# Patient Record
Sex: MALE | Race: WHITE | Employment: FULL TIME | URBAN - METROPOLITAN AREA
[De-identification: names, ages, dates, MRNs, and addresses within clinical notes are randomized per-mention and may not be internally consistent; named-entity substitution may affect disease eponyms.]

---
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
## 2022-08-13 ENCOUNTER — HOSPITAL ENCOUNTER (EMERGENCY)
Age: 22
Discharge: LEFT BEFORE DISCHARGE | End: 2022-08-13

## 2022-08-13 VITALS
HEART RATE: 98 BPM | DIASTOLIC BLOOD PRESSURE: 81 MMHG | RESPIRATION RATE: 16 BRPM | BODY MASS INDEX: 25.76 KG/M2 | OXYGEN SATURATION: 98 % | WEIGHT: 170 LBS | HEIGHT: 68 IN | TEMPERATURE: 98 F | SYSTOLIC BLOOD PRESSURE: 146 MMHG

## 2022-08-13 DIAGNOSIS — Z53.21 ELOPED FROM EMERGENCY DEPARTMENT: Primary | ICD-10-CM

## 2022-08-13 ASSESSMENT — PAIN - FUNCTIONAL ASSESSMENT: PAIN_FUNCTIONAL_ASSESSMENT: 0-10

## 2022-08-13 ASSESSMENT — PAIN DESCRIPTION - DESCRIPTORS: DESCRIPTORS: THROBBING

## 2022-08-13 ASSESSMENT — PAIN DESCRIPTION - LOCATION: LOCATION: NOSE

## 2022-08-13 ASSESSMENT — PAIN SCALES - GENERAL: PAINLEVEL_OUTOF10: 3

## 2022-08-13 ASSESSMENT — PAIN DESCRIPTION - PAIN TYPE: TYPE: ACUTE PAIN

## 2022-08-14 ENCOUNTER — APPOINTMENT (OUTPATIENT)
Dept: GENERAL RADIOLOGY | Age: 22
End: 2022-08-14
Payer: COMMERCIAL

## 2022-08-14 ENCOUNTER — HOSPITAL ENCOUNTER (EMERGENCY)
Age: 22
Discharge: HOME | End: 2022-08-14
Payer: COMMERCIAL

## 2022-08-14 VITALS
RESPIRATION RATE: 14 BRPM | SYSTOLIC BLOOD PRESSURE: 137 MMHG | HEART RATE: 71 BPM | OXYGEN SATURATION: 97 % | DIASTOLIC BLOOD PRESSURE: 76 MMHG | TEMPERATURE: 97.2 F

## 2022-08-14 DIAGNOSIS — S02.2XXA CLOSED FRACTURE OF NASAL BONE, INITIAL ENCOUNTER: Primary | ICD-10-CM

## 2022-08-14 PROCEDURE — 99283 EMERGENCY DEPT VISIT LOW MDM: CPT

## 2022-08-14 PROCEDURE — 6370000000 HC RX 637 (ALT 250 FOR IP): Performed by: PHYSICIAN ASSISTANT

## 2022-08-14 PROCEDURE — 70160 X-RAY EXAM OF NASAL BONES: CPT

## 2022-08-14 RX ORDER — KETOROLAC TROMETHAMINE 10 MG/1
10 TABLET, FILM COATED ORAL ONCE
Status: COMPLETED | OUTPATIENT
Start: 2022-08-14 | End: 2022-08-14

## 2022-08-14 RX ADMIN — KETOROLAC TROMETHAMINE 10 MG: 10 TABLET, FILM COATED ORAL at 18:14

## 2022-08-14 NOTE — ED PROVIDER NOTES
[facility] Emergency Department    CHIEF COMPLAINT  Nose Problem (Someone ran into his nose while playing basketball)      SHARED SERVICE VISIT  Evaluated by LIZ. My supervising physician was available for consultation. HISTORY OF PRESENT ILLNESS  Emre Warner is a 23 y.o. male who presents to the ED complaining of 1 day history of nasal pain and swelling. Patient accompanied by girlfriend today for evaluation. Patient states that he was playing basketball when he was accidentally head butted in the nose. Reports nosebleed. Unaware of which side. Reports pain 5 out of 10. He has attempted no interventions. Does not appear to radiate. No headache, lightheadedness, dizziness or confusion. Denies loss of consciousness. No neck or back pain. Denies eye pain. No visual changes disturbances. No dental pain. Order    No other complaints, modifying factors or associated symptoms. Nursing notes reviewed. History reviewed. No pertinent past medical history. History reviewed. No pertinent surgical history. History reviewed. No pertinent family history.   Social History     Socioeconomic History    Marital status: Single     Spouse name: Not on file    Number of children: Not on file    Years of education: Not on file    Highest education level: Not on file   Occupational History    Not on file   Tobacco Use    Smoking status: Never    Smokeless tobacco: Never   Vaping Use    Vaping Use: Every day    Substances: Nicotine   Substance and Sexual Activity    Alcohol use: No    Drug use: No    Sexual activity: Never   Other Topics Concern    Not on file   Social History Narrative    Not on file     Social Determinants of Health     Financial Resource Strain: Not on file   Food Insecurity: Not on file   Transportation Needs: Not on file   Physical Activity: Not on file   Stress: Not on file   Social Connections: Not on file   Intimate Partner Violence: Not on file   Housing Stability: Not on file     Current Facility-Administered Medications   Medication Dose Route Frequency Provider Last Rate Last Admin    ketorolac (TORADOL) tablet 10 mg  10 mg Oral Once Cara Sena, [address]         No current outpatient medications on file. No Known Allergies    REVIEW OF SYSTEMS  10 systems reviewed, pertinent positives per HPI otherwise noted to be negative    PHYSICAL EXAM  /76   Pulse 71   Temp 97.2 °F (36.2 °C) (Axillary)   Resp 14   SpO2 97%   GENERAL APPEARANCE: Awake and alert. Cooperative. No acute distress. HEAD: Normocephalic. Atraumatic. EYES: PERRL. EOM's grossly intact. Mild infraorbital ecchymoses bilaterally. No globe tenderness. No blood noted to anterior chambers. ENT: Mucous membranes are moist.  No oral lesions lacerations. No TMJ pain or malocclusion. Nasal soft tissue swelling with tenderness over bridge. No epistaxis. No septal hematomas. NECK: Supple. HEART: RRR. No murmurs. LUNGS: Respirations unlabored. CTAB. Good air exchange. Speaking comfortably in full sentences. ABDOMEN: Soft. Non-distended. Non-tender. No guarding or rebound. No masses. No organomegaly. EXTREMITIES: No peripheral edema. Moves all extremities equally. All extremities neurovascularly intact. SKIN: Warm and dry. No acute rashes. NEUROLOGICAL: Alert and oriented. CN's 2-12 intact. No gross facial drooping. Strength 5/5, sensation intact. PSYCHIATRIC: Normal mood and affect. RADIOLOGY  XR NASAL BONE (MIN 3 VIEWS )    Result Date: 8/14/2022  EXAMINATION: THREE XRAY VIEWS OF THE NASAL BONES 8/14/2022 5:11 pm COMPARISON: None. HISTORY: ORDERING SYSTEM PROVIDED HISTORY: poss broken nose TECHNOLOGIST PROVIDED HISTORY: Reason for exam:->poss broken nose Reason for Exam: possible broken nose FINDINGS: Paranasal sinuses appear clear. Minimally displaced fractures superior nasal bone. No other fractures identified.      Minimally displaced fractures superior nasal bones ED COURSE  Patient received Toradol for pain, with good relief. Triage vitals stable. Nasal bone x-rays with minimally displaced superior nasal bone fractures. At this time patient will be discharged home with nosebleed precautions as well as ENT referral for further outpatient follow-up. Have discussed return precautions and recommendations for follow-up otherwise and patient in agreement and comfortable with discharge. A discussion was had with Mr. Dennise Tirado regarding facial injury, ED findings and recommendations for follow-up. Risk management discussed and shared decision making had with patient and/or surrogate. All questions were answered. Patient will follow up with ENT within 1 week for further evaluation/treatment. All questions answered. Patient will return to ED for new/worsening symptoms. Patient will continue over-the-counter Tylenol and or Motrin as needed for pain. MDM  I estimate there is LOW risk for a ANAPHYLAXIS, DEEP SPACE INFECTION (e.g., GEREMIASS ANGINA OR RETROPHARYNGEAL ABSCESS), EPIGLOTTITIS, MENINGITIS, or AIRWAY COMPROMISE, thus I consider the discharge disposition reasonable. Also, there is no evidence or peritonitis, sepsis, or toxicity. Rubi Espinoza and I have discussed the diagnosis and risks, and we agree with discharging home to follow-up with their primary doctor. We also discussed returning to the Emergency Department immediately if new or worsening symptoms occur. We have discussed the symptoms which are most concerning (e.g., changing or worsening pain, trouble swallowing or breathing, neck stiffness or fever) that necessitate immediate return. Final Impression  1. Closed fracture of nasal bone, initial encounter      Discharge Vital Signs:  Blood pressure 137/76, pulse 71, temperature 97.2 °F (36.2 °C), temperature source Axillary, resp. rate 14, SpO2 97 %. DISPOSITION  Patient was discharged to home in good condition.           Shirley Hansen [place]  08/14/22 [address] [facility],[address]

## 2022-08-14 NOTE — ED PROVIDER NOTES
[address] ED  EMERGENCY DEPARTMENT ENCOUNTER        Pt Name: Lupe Lisa  MRN: [id number]  [place] 2000  Date of evaluation: 8/13/2022  Provider: MARYLOU Brand CNP  PCP: No primary care provider on file. I was notified by nursing staff the patient eloped from the emergency department prior to my evaluation.             MARYLOU Brand CNP  08/13/22 7787

## 2025-04-19 ENCOUNTER — APPOINTMENT (OUTPATIENT)
Dept: GENERAL RADIOLOGY | Age: 25
End: 2025-04-19

## 2025-04-19 ENCOUNTER — HOSPITAL ENCOUNTER (EMERGENCY)
Age: 25
Discharge: HOME | End: 2025-04-19
Attending: EMERGENCY MEDICINE

## 2025-04-19 VITALS
TEMPERATURE: 98 F | RESPIRATION RATE: 16 BRPM | DIASTOLIC BLOOD PRESSURE: 54 MMHG | BODY MASS INDEX: 25.18 KG/M2 | SYSTOLIC BLOOD PRESSURE: 118 MMHG | HEIGHT: 69 IN | HEART RATE: 75 BPM | OXYGEN SATURATION: 96 % | WEIGHT: 170 LBS

## 2025-04-19 DIAGNOSIS — S61.412A LACERATION OF LEFT HAND WITHOUT FOREIGN BODY, INITIAL ENCOUNTER: Primary | ICD-10-CM

## 2025-04-19 PROCEDURE — 12002 RPR S/N/AX/GEN/TRNK2.6-7.5CM: CPT

## 2025-04-19 PROCEDURE — 90471 IMMUNIZATION ADMIN: CPT | Performed by: EMERGENCY MEDICINE

## 2025-04-19 PROCEDURE — 6360000002 HC RX W HCPCS: Performed by: EMERGENCY MEDICINE

## 2025-04-19 PROCEDURE — 73130 X-RAY EXAM OF HAND: CPT

## 2025-04-19 PROCEDURE — 90715 TDAP VACCINE 7 YRS/> IM: CPT | Performed by: EMERGENCY MEDICINE

## 2025-04-19 PROCEDURE — 99284 EMERGENCY DEPT VISIT MOD MDM: CPT

## 2025-04-19 RX ORDER — ACETAMINOPHEN 500 MG
500 TABLET ORAL EVERY 6 HOURS PRN
Qty: 30 TABLET | Refills: 0 | Status: SHIPPED | OUTPATIENT
Start: 2025-04-19

## 2025-04-19 RX ORDER — BACITRACIN ZINC AND POLYMYXIN B SULFATE 500; 1000 [USP'U]/G; [USP'U]/G
OINTMENT TOPICAL
Qty: 15 G | Refills: 1 | Status: SHIPPED | OUTPATIENT
Start: 2025-04-19 | End: 2025-04-26

## 2025-04-19 RX ORDER — CEPHALEXIN 500 MG/1
500 CAPSULE ORAL 3 TIMES DAILY
Qty: 21 CAPSULE | Refills: 0 | Status: SHIPPED | OUTPATIENT
Start: 2025-04-19 | End: 2025-04-26

## 2025-04-19 RX ADMIN — TETANUS TOXOID, REDUCED DIPHTHERIA TOXOID AND ACELLULAR PERTUSSIS VACCINE, ADSORBED 0.5 ML: 5; 2.5; 8; 8; 2.5 SUSPENSION INTRAMUSCULAR at 06:39

## 2025-04-19 ASSESSMENT — LIFESTYLE VARIABLES
HOW MANY STANDARD DRINKS CONTAINING ALCOHOL DO YOU HAVE ON A TYPICAL DAY: 1 OR 2
HOW OFTEN DO YOU HAVE A DRINK CONTAINING ALCOHOL: MONTHLY OR LESS

## 2025-04-19 ASSESSMENT — PAIN SCALES - GENERAL: PAINLEVEL_OUTOF10: 2

## 2025-04-19 NOTE — ED PROVIDER NOTES
EMERGENCY DEPARTMENT ENCOUNTER     [facility] EMERGENCY DEPARTMENT     Pt Name: Segundo Barrientos   MRN: [id number]   Birthdate 2000   Date of evaluation: 4/19/2025   Provider: Fortino Neff MD   PCP: No primary care provider on file.   Note Started: 9:45 AM EDT 4/19/25     CHIEF COMPLAINT     Chief Complaint   Patient presents with    Hand Injury     L hand laceration in two places. Pt states he fell down the steps with a glass in his hand. He does not think he has any glass in the wound. He does not know when his last TDAP was.         HISTORY OF PRESENT ILLNESS:  History from : Patient   Limitations to history : None     Segundo Barrientos is a 25 y.o. male who presents after a fall with hand laceration.  Patient states he fell down some steps with a glass in his hand causing him to lacerate his left hand and just proximal to the left hand.  This occurred around 1:30 AM.  He denies any other injury.  Denies any head trauma, loss conscious, chest pain, upper or lower back pain, neck pain.  He only complains of pain to his hand where his lacerations are.  No weakness or numbness.  No sensation of foreign body.    Nursing Notes were all reviewed and agreed with or any disagreements were addressed in the HPI.     ROS: Positives and Pertinent negatives as per HPI.    PAST MEDICAL HISTORY     PHYSICAL EXAM:  ED Triage Vitals   BP Systolic BP Percentile Diastolic BP Percentile Temp Temp Source Pulse Respirations SpO2   04/19/25 0524 -- -- 04/19/25 0523 04/19/25 0630 04/19/25 0523 04/19/25 0523 04/19/25 0523   126/73   97.8 °F (36.6 °C) Oral 71 18 98 %      Height Weight - Scale         04/19/25 0523 04/19/25 0523         1.753 m (5' 9\") 77.1 kg (170 lb)              Physical Exam   PHYSICAL EXAM  BP (!) 118/54   Pulse 75   Temp 98 °F (36.7 °C) (Oral)   Resp 16   Ht 1.753 m (5' 9\")   Wt 77.1 kg (170 lb)   SpO2 96%   BMI 25.10 kg/m²   GENERAL APPEARANCE: Awake and alert. Well appearing. No acute